# Patient Record
Sex: MALE | Race: OTHER | NOT HISPANIC OR LATINO | URBAN - METROPOLITAN AREA
[De-identification: names, ages, dates, MRNs, and addresses within clinical notes are randomized per-mention and may not be internally consistent; named-entity substitution may affect disease eponyms.]

---

## 2023-10-28 ENCOUNTER — EMERGENCY (EMERGENCY)
Facility: HOSPITAL | Age: 38
LOS: 1 days | Discharge: HOME | End: 2023-10-28
Admitting: EMERGENCY MEDICINE
Payer: SELF-PAY

## 2023-10-28 VITALS
OXYGEN SATURATION: 94 % | RESPIRATION RATE: 16 BRPM | TEMPERATURE: 98 F | SYSTOLIC BLOOD PRESSURE: 172 MMHG | HEART RATE: 115 BPM | DIASTOLIC BLOOD PRESSURE: 97 MMHG

## 2023-10-28 DIAGNOSIS — F10.129 ALCOHOL ABUSE WITH INTOXICATION, UNSPECIFIED: ICD-10-CM

## 2023-10-28 DIAGNOSIS — R41.82 ALTERED MENTAL STATUS, UNSPECIFIED: ICD-10-CM

## 2023-10-28 PROCEDURE — 99283 EMERGENCY DEPT VISIT LOW MDM: CPT

## 2023-10-28 NOTE — ED ADULT NURSE NOTE - OBJECTIVE STATEMENT
JAMISON from [facility], Pt was called in by [facility] for etoh use. Refusing to answer questions, refuse assessment, no s/s of injury or distress noted, will continue to monitor

## 2023-10-28 NOTE — ED PROVIDER NOTE - OBJECTIVE STATEMENT
Yes yes patient brought in by EMS for altered mental status history limited as patient is uncooperative will not provide name.  Admits to drinking today.  Denies any trauma.  Has no complaints and is requesting to go

## 2023-10-28 NOTE — ED PROVIDER NOTE - PATIENT PORTAL LINK FT
You can access the FollowMyHealth Patient Portal offered by [facility] by registering at the following website: [email or web address]. By joining [employer]’s FollowMyHealth portal, you will also be able to view your health information using other applications (apps) compatible with our system.

## 2023-10-28 NOTE — ED PROVIDER NOTE - NS ED MD DISPO DISCHARGE
Home
PAST SURGICAL HISTORY:  H/O heart artery stent     History of appendectomy     History of hand surgery     S/P cardiac cath

## 2023-10-28 NOTE — ED ADULT NURSE NOTE - CHIEF COMPLAINT QUOTE
BIBEMS from [facility], Pt was called in by [facility] for etoh use. Pt not cooperative in triage, will not say name or birthday, just keeps laughing and playing with his watch. No visible wounds. Breathing is even and unlabored.

## 2023-10-28 NOTE — ED ADULT TRIAGE NOTE - CHIEF COMPLAINT QUOTE
BIBEMS from [facility], Pt was called in by [facility] for etoh use. Pt not cooperative in triage, will not say name or birthday, just keeps laughing and playing with his watch. No visible wounds. Breathing is even and unlabored.

## 2024-01-19 NOTE — ED PROVIDER NOTE - CLINICAL SUMMARY MEDICAL DECISION MAKING FREE TEXT BOX
Fall with Harm Risk
Patient BIB EMS for AMS admits to drinking. speech fluid, gait steady. safe for d/c